# Patient Record
Sex: MALE | Race: BLACK OR AFRICAN AMERICAN | NOT HISPANIC OR LATINO | Employment: UNEMPLOYED | URBAN - METROPOLITAN AREA
[De-identification: names, ages, dates, MRNs, and addresses within clinical notes are randomized per-mention and may not be internally consistent; named-entity substitution may affect disease eponyms.]

---

## 2022-05-06 ENCOUNTER — HOSPITAL ENCOUNTER (EMERGENCY)
Facility: OTHER | Age: 62
Discharge: HOME | End: 2022-05-06
Attending: EMERGENCY MEDICINE
Payer: MEDICAID

## 2022-05-06 VITALS
OXYGEN SATURATION: 98 % | TEMPERATURE: 99 F | DIASTOLIC BLOOD PRESSURE: 92 MMHG | HEIGHT: 64 IN | HEART RATE: 78 BPM | RESPIRATION RATE: 16 BRPM | BODY MASS INDEX: 24.75 KG/M2 | SYSTOLIC BLOOD PRESSURE: 152 MMHG | WEIGHT: 145 LBS

## 2022-05-06 DIAGNOSIS — R10.9 ABDOMINAL PAIN, UNSPECIFIED ABDOMINAL LOCATION: ICD-10-CM

## 2022-05-06 DIAGNOSIS — K92.2 LOWER GI BLEED: Primary | ICD-10-CM

## 2022-05-06 LAB
ABO + RH BLD: NORMAL
ALBUMIN SERPL BCP-MCNC: 4.1 G/DL (ref 3.5–5.2)
ALP SERPL-CCNC: 70 U/L (ref 55–135)
ALT SERPL W/O P-5'-P-CCNC: 33 U/L (ref 10–44)
ANION GAP SERPL CALC-SCNC: 11 MMOL/L (ref 8–16)
AST SERPL-CCNC: 22 U/L (ref 10–40)
BASOPHILS # BLD AUTO: 0.01 K/UL (ref 0–0.2)
BASOPHILS NFR BLD: 0.2 % (ref 0–1.9)
BILIRUB SERPL-MCNC: 0.4 MG/DL (ref 0.1–1)
BILIRUB UR QL STRIP: NEGATIVE
BLD GP AB SCN CELLS X3 SERPL QL: NORMAL
BUN SERPL-MCNC: 16 MG/DL (ref 6–20)
CALCIUM SERPL-MCNC: 9.8 MG/DL (ref 8.7–10.5)
CHLORIDE SERPL-SCNC: 104 MMOL/L (ref 95–110)
CLARITY UR: CLEAR
CO2 SERPL-SCNC: 26 MMOL/L (ref 23–29)
COLOR UR: YELLOW
CREAT SERPL-MCNC: 0.9 MG/DL (ref 0.5–1.4)
DIFFERENTIAL METHOD: ABNORMAL
EOSINOPHIL # BLD AUTO: 0.3 K/UL (ref 0–0.5)
EOSINOPHIL NFR BLD: 5.1 % (ref 0–8)
ERYTHROCYTE [DISTWIDTH] IN BLOOD BY AUTOMATED COUNT: 12.6 % (ref 11.5–14.5)
EST. GFR  (AFRICAN AMERICAN): >60 ML/MIN/1.73 M^2
EST. GFR  (NON AFRICAN AMERICAN): >60 ML/MIN/1.73 M^2
GLUCOSE SERPL-MCNC: 79 MG/DL (ref 70–110)
GLUCOSE UR QL STRIP: NEGATIVE
HCT VFR BLD AUTO: 45 % (ref 40–54)
HGB BLD-MCNC: 15.1 G/DL (ref 14–18)
HGB UR QL STRIP: ABNORMAL
IMM GRANULOCYTES # BLD AUTO: 0.04 K/UL (ref 0–0.04)
IMM GRANULOCYTES NFR BLD AUTO: 0.6 % (ref 0–0.5)
KETONES UR QL STRIP: NEGATIVE
LEUKOCYTE ESTERASE UR QL STRIP: NEGATIVE
LYMPHOCYTES # BLD AUTO: 1.5 K/UL (ref 1–4.8)
LYMPHOCYTES NFR BLD: 22.9 % (ref 18–48)
MCH RBC QN AUTO: 34.8 PG (ref 27–31)
MCHC RBC AUTO-ENTMCNC: 33.6 G/DL (ref 32–36)
MCV RBC AUTO: 104 FL (ref 82–98)
MONOCYTES # BLD AUTO: 0.8 K/UL (ref 0.3–1)
MONOCYTES NFR BLD: 13.3 % (ref 4–15)
NEUTROPHILS # BLD AUTO: 3.7 K/UL (ref 1.8–7.7)
NEUTROPHILS NFR BLD: 57.9 % (ref 38–73)
NITRITE UR QL STRIP: NEGATIVE
NRBC BLD-RTO: 0 /100 WBC
PH UR STRIP: 6 [PH] (ref 5–8)
PLATELET # BLD AUTO: 226 K/UL (ref 150–450)
PMV BLD AUTO: 8.9 FL (ref 9.2–12.9)
POTASSIUM SERPL-SCNC: 4 MMOL/L (ref 3.5–5.1)
PROT SERPL-MCNC: 7.4 G/DL (ref 6–8.4)
PROT UR QL STRIP: NEGATIVE
RBC # BLD AUTO: 4.34 M/UL (ref 4.6–6.2)
SODIUM SERPL-SCNC: 141 MMOL/L (ref 136–145)
SP GR UR STRIP: 1.02 (ref 1–1.03)
URN SPEC COLLECT METH UR: ABNORMAL
UROBILINOGEN UR STRIP-ACNC: NEGATIVE EU/DL
WBC # BLD AUTO: 6.32 K/UL (ref 3.9–12.7)

## 2022-05-06 PROCEDURE — 96360 HYDRATION IV INFUSION INIT: CPT

## 2022-05-06 PROCEDURE — 25000003 PHARM REV CODE 250: Performed by: PHYSICIAN ASSISTANT

## 2022-05-06 PROCEDURE — 80053 COMPREHEN METABOLIC PANEL: CPT | Performed by: PHYSICIAN ASSISTANT

## 2022-05-06 PROCEDURE — 25500020 PHARM REV CODE 255: Performed by: PHYSICIAN ASSISTANT

## 2022-05-06 PROCEDURE — 81003 URINALYSIS AUTO W/O SCOPE: CPT | Performed by: PHYSICIAN ASSISTANT

## 2022-05-06 PROCEDURE — 99285 EMERGENCY DEPT VISIT HI MDM: CPT | Mod: 25

## 2022-05-06 PROCEDURE — 86901 BLOOD TYPING SEROLOGIC RH(D): CPT | Performed by: PHYSICIAN ASSISTANT

## 2022-05-06 PROCEDURE — 85025 COMPLETE CBC W/AUTO DIFF WBC: CPT | Performed by: PHYSICIAN ASSISTANT

## 2022-05-06 RX ORDER — ACETAMINOPHEN 325 MG/1
650 TABLET ORAL
Status: COMPLETED | OUTPATIENT
Start: 2022-05-06 | End: 2022-05-06

## 2022-05-06 RX ORDER — LIDOCAINE 50 MG/G
1 PATCH TOPICAL
Status: DISCONTINUED | OUTPATIENT
Start: 2022-05-06 | End: 2022-05-06 | Stop reason: HOSPADM

## 2022-05-06 RX ORDER — DOCUSATE SODIUM 100 MG/1
100 CAPSULE, LIQUID FILLED ORAL 2 TIMES DAILY PRN
Qty: 60 CAPSULE | Refills: 0 | Status: SHIPPED | OUTPATIENT
Start: 2022-05-06

## 2022-05-06 RX ORDER — DICLOFENAC SODIUM 10 MG/G
2 GEL TOPICAL 4 TIMES DAILY
Qty: 20 G | Refills: 0 | Status: SHIPPED | OUTPATIENT
Start: 2022-05-06

## 2022-05-06 RX ORDER — LIDOCAINE 50 MG/G
1 PATCH TOPICAL DAILY
Qty: 15 PATCH | Refills: 0 | Status: SHIPPED | OUTPATIENT
Start: 2022-05-06

## 2022-05-06 RX ADMIN — ACETAMINOPHEN 650 MG: 325 TABLET, FILM COATED ORAL at 11:05

## 2022-05-06 RX ADMIN — LIDOCAINE 1 PATCH: 50 PATCH CUTANEOUS at 11:05

## 2022-05-06 RX ADMIN — IOHEXOL 75 ML: 350 INJECTION, SOLUTION INTRAVENOUS at 01:05

## 2022-05-06 RX ADMIN — SODIUM CHLORIDE 1000 ML: 0.9 INJECTION, SOLUTION INTRAVENOUS at 11:05

## 2022-05-06 NOTE — FIRST PROVIDER EVALUATION
Emergency Department TeleTriage Encounter Note      CHIEF COMPLAINT    Chief Complaint   Patient presents with    Rectal Bleeding     Pt is at the [facility]. Pt c.o rectal bleeding with abd pain onset this AM. Pt states bm this AM with dark red blood present. Pt states he had difficulty getting stool out.  Pt c.o mid abd pain to right side.  Pt also c.o pain on urination. Pt has hx constipation. Conjunctiva pink         VITAL SIGNS   Initial Vitals [05/06/22 0940]   BP Pulse Resp Temp SpO2   125/73 83 18 98.7 °F (37.1 °C) 97 %      MAP       --            ALLERGIES    Review of patient's allergies indicates:  No Known Allergies    PROVIDER TRIAGE NOTE  HPI: Alexander Vernon, a 61 y.o. male presents to the ED dark red stools that started last week, resolved, then returned.  Generalized abdominal pain and HA.     Constitutional: Vital signs WNL, well nourished, well developed, appearing stated age, NAD   HEENT: NCAT, symmetrical lids, No obvious facial deformity.  Normal phonation. Normal Conjunctiva, Gross EOMs intact   Neck: NAROM   Respiratory: Normal effort.  No obvious use of accessory muscles   Musculoskeletal: Moved upper extremities well   Neuro: Alert, answers questions appropriately    Psych: appropriate mood and affect          ORDERS  Labs Reviewed - No data to display    ED Orders (720h ago, onward)    None            Virtual Visit Note: The provider triage portion of this emergency department evaluation and documentation was performed via [employer], a HIPAA-compliant telemedicine application, in concert with a tele-presenter in the room. A face to face patient evaluation with one of my colleagues will occur once the patient is placed in an emergency department room.      DISCLAIMER: This note was prepared with [employer] voice recognition transcription software. Garbled syntax, mangled pronouns, and other bizarre constructions may be attributed to that software system.

## 2022-05-06 NOTE — ED TRIAGE NOTES
Patient presents to ED with c/o lower abdominal pain and large amounts of rectal bleeding today. He describes the blood as dark red. He also c/o R flank pain and nausea. He does report being constipated for the last few days with his first bowel movement being otday.  Denies urinary complaints or fever.

## 2022-05-06 NOTE — ED PROVIDER NOTES
CHIEF COMPLAINT:   Chief Complaint   Patient presents with    Rectal Bleeding     Pt is at the [facility]. Pt c.o rectal bleeding with abd pain onset this AM. Pt states bm this AM with dark red blood present. Pt states he had difficulty getting stool out.  Pt c.o mid abd pain to right side.  Pt also c.o pain on urination. Pt has hx constipation. Conjunctiva pink         HISTORY OF PRESENT ILLNESS: Alexander Vernon who is a 61 y.o. male with PMH of colon cancer status colectomy presents to the emergency department today with complaint of bright red blood with wiping x1 episode today.  Patient also reports history of constipation.  States that he had large bowel or back UA shin today which is typical of him.  He does state that he continues with some right-sided abdominal pain in right rib pain.  He states that he notices the right rib pain exacerbate with smoking.  He states that he did subsequent for some time however began again recently.  He is currently resident of [facility].  Denies any recent alcohol or drug use.  Denies any nausea, vomiting or additional complaints.  Has not tried any medications for the symptoms.  He is due to follow with GI in the near future for repeat colonoscopy.    REVIEW OF SYSTEMS:  Constitutional: no fever, no chills.  Eyes: No discharge. No pain.  HENT: no nasal congestion, no sore throat.   Cardiovascular: right lower rib pain. No left chest pain, no palpitations.  Respiratory: + cough, no shortness of breath.  Gastrointestinal: no nausea, no diarrhea, + abdominal pain, no vomiting, + blood in stool  Genitourinary: No hematuria, dysuria, urgency.  Musculoskeletal: no  back pain, no body aches.  Skin: No rashes, no lesions.  Neurological: no headache, no focal weakness.    Otherwise remaining ROS negative     ALLERGIES REVIEWED  MEDICATIONS REVIEWED  PMH/PSH/SOC/FH REVIEWED     The history is provided by the patient.    Nursing/Ancillary staff note reviewed.        PHYSICAL  EXAM:  VS reviewed  Vitals:    05/06/22 1507   BP: (!) 152/92   Pulse: 78   Resp: 16   Temp: 99.2 °F (37.3 °C)       General Appearance: The patient is alert, has no immediate or signs of toxicity. No acute distress.    HEENT: Eyes:  With no injection, No drainage.   Neck:Neck is with No stridor.   Respiratory: There are no retractions.  Lungs CTA  Cardiovascular: Regular rate and rhythm.  TTP of the right lower rib border no erythema, ecchymosis or rash  Gastrointestinal:  Abdomen is without distention.  TTP of the right upper lower quadrants.  No focal McBurney or Douglas sign.  No rebound, rigidity or guarding  :  No obvious blood in brief sore at the rectum.  No external hemorrhoid or fissure.  Good tone.  Neurological: Alert and oriented x 4. No focal weakness.  Skin: Warm and dry, no rashes.  Musculoskeletal: Extremities are non-swollen and have full range of motion.      History reviewed. No pertinent past medical history.      History reviewed. No pertinent surgical history.      ED COURSE:     Results for orders placed or performed during the hospital encounter of 05/06/22   CBC auto differential   Result Value Ref Range    WBC 6.32 3.90 - 12.70 K/uL    RBC 4.34 (L) 4.60 - 6.20 M/uL    Hemoglobin 15.1 14.0 - 18.0 g/dL    Hematocrit 45.0 40.0 - 54.0 %     (H) 82 - 98 fL    MCH 34.8 (H) 27.0 - 31.0 pg    MCHC 33.6 32.0 - 36.0 g/dL    RDW 12.6 11.5 - 14.5 %    Platelets 226 150 - 450 K/uL    MPV 8.9 (L) 9.2 - 12.9 fL    Immature Granulocytes 0.6 (H) 0.0 - 0.5 %    Gran # (ANC) 3.7 1.8 - 7.7 K/uL    Immature Grans (Abs) 0.04 0.00 - 0.04 K/uL    Lymph # 1.5 1.0 - 4.8 K/uL    Mono # 0.8 0.3 - 1.0 K/uL    Eos # 0.3 0.0 - 0.5 K/uL    Baso # 0.01 0.00 - 0.20 K/uL    nRBC 0 0 /100 WBC    Gran % 57.9 38.0 - 73.0 %    Lymph % 22.9 18.0 - 48.0 %    Mono % 13.3 4.0 - 15.0 %    Eosinophil % 5.1 0.0 - 8.0 %    Basophil % 0.2 0.0 - 1.9 %    Differential Method Automated    Comprehensive metabolic panel   Result  Value Ref Range    Sodium 141 136 - 145 mmol/L    Potassium 4.0 3.5 - 5.1 mmol/L    Chloride 104 95 - 110 mmol/L    CO2 26 23 - 29 mmol/L    Glucose 79 70 - 110 mg/dL    BUN 16 6 - 20 mg/dL    Creatinine 0.9 0.5 - 1.4 mg/dL    Calcium 9.8 8.7 - 10.5 mg/dL    Total Protein 7.4 6.0 - 8.4 g/dL    Albumin 4.1 3.5 - 5.2 g/dL    Total Bilirubin 0.4 0.1 - 1.0 mg/dL    Alkaline Phosphatase 70 55 - 135 U/L    AST 22 10 - 40 U/L    ALT 33 10 - 44 U/L    Anion Gap 11 8 - 16 mmol/L    eGFR if African American >60 >60 mL/min/1.73 m^2    eGFR if non African American >60 >60 mL/min/1.73 m^2   Urinalysis, Reflex to Urine Culture Urine, Clean Catch    Specimen: Urine   Result Value Ref Range    Specimen UA Urine, Clean Catch     Color, UA Yellow Yellow, Straw, Karen    Appearance, UA Clear Clear    pH, UA 6.0 5.0 - 8.0    Specific Gravity, UA 1.025 1.005 - 1.030    Protein, UA Negative Negative    Glucose, UA Negative Negative    Ketones, UA Negative Negative    Bilirubin (UA) Negative Negative    Occult Blood UA Trace (A) Negative    Nitrite, UA Negative Negative    Urobilinogen, UA Negative <2.0 EU/dL    Leukocytes, UA Negative Negative   Type & Screen   Result Value Ref Range    Group & Rh O POS     Indirect Luiz NEG      Imaging Results          CT Abdomen Pelvis With Contrast (Final result)  Result time 05/06/22 14:19:08    Final result by Karen Llamas MD (05/06/22 14:19:08)                 Impression:      There are few loops of fluid-filled small intestine which is nonspecific but can be seen in entities such as gastroenteritis.    Postsurgical changes in keeping with history of colectomy.    The right femoral head findings are likely leads osteoarthritis however avascular necrosis is also in the differential.      Electronically signed by: Karen Llamas MD  Date:    05/06/2022  Time:    14:19             Narrative:    EXAMINATION:  CT ABDOMEN PELVIS WITH CONTRAST    CLINICAL HISTORY:  RLQ abdominal pain (Age >=  14y);previous colectomy for colon CA 2014;    TECHNIQUE:  Low dose axial images, sagittal and coronal reformations were obtained from the lung bases to the pubic symphysis following the IV administration of 75 mL of Omnipaque 350 .  Oral contrast was not given.    COMPARISON:  None.    FINDINGS:  Lower chest: Evaluation of the visualized portions of the lung bases, heart and pericardium show no abnormalities.    Gastrointestinal tract: Anastomotic sutures are identified at the midpoint of the transverse colon in this patient who has undergone proximal colonic hemicolectomy.  There are a few diverticuli in the proximal descending colon with no evidence of diverticulitis.  The remaining portions of the colon are normal.  The small intestines are partially fluid-filled but are otherwise within normal limits.  The stomach with its small hiatal hernia is normal.    Genitourinary system: The kidneys, expected course of the ureters and the partially decompressed bladder are normal.    The liver portal veins, gallbladder, spleen, pancreas, adrenal glands, prostate and rectum are normal.    The abdominal aorta tapers normally.    Peritoneum/retroperitoneum: There is no free fluid, free air or lymphadenopathy.    Osseous and soft tissue structures: There is sufficient is lucency with sclerosis about the right femoral head.  Additionally, there is moderate to severe bilateral joint space loss of the hips.  Senescent changes are identified through the visualized portions of the spine.  The soft tissues are normal.                                Patient presenting with bright red blood per rectum x1.  appears well and nontoxic. Exam grossly unremarkable at this time.    Differential Diagnosis includes, but is not limited to:  Lower GI bleed (AVM, colitis, colon cancer, polyp, internal/external hemorrhoid, IBS, rectal injury/foreign body, chronic diarrhea, anal fissure), upper GI bleed (PUD, perforated ulcer, esophageal variceal  bleed), Crohns disease, ulcerative colitis, chronic diarrhea, dietary intake      ED management: Patient seen for 1 episode of bright red blood.  Does endorse a constipation history.  Given his history of colon CA in no recent screening or evaluation will obtain labs and CT.  Labs grossly unremarkable.  Hemodynamically stable.  No recurrence in the ED.  some fluid-filled loops within the small intestines.  No additional acute findings to explain etiology of symptoms.  As he is afebrile with no leukocytosis did not feel antibiotics warranted at this time.  Cautioned on signs and symptoms to look for for prompt return to the ED attempted to send stool sample however could not produce in the ED.  will place referral within the [facility] system although he does have previous GI follow-up.  Did not feel any additional labs or imaging warranted to evaluate rib pain is appears reproducible.  Encouraged smoking cessation and will send home with topical over-the-counter agents.      IMPRESSION  The primary encounter diagnosis was Lower GI bleed. A diagnosis of Abdominal pain, unspecified abdominal location was also pertinent to this visit. Strict instructions to follow up with primary care physician or reference provided for further assessment and evaluation. Given instructions to return for any acute symptoms and verbalized understanding of this medical plan.      Please pardon typos or dictation errors, as this note was transcribed using [employer] direct dictation software.                                BRADEN Moran  05/07/22 1111

## 2022-05-06 NOTE — Clinical Note
"Alexander Mejiat"Saulo was seen and treated in our emergency department on 5/6/2022.  He may return to work on 05/09/2022.       If you have any questions or concerns, please don't hesitate to call.      BRADEN Moran"